# Patient Record
Sex: FEMALE | Race: WHITE | Employment: PART TIME | URBAN - METROPOLITAN AREA
[De-identification: names, ages, dates, MRNs, and addresses within clinical notes are randomized per-mention and may not be internally consistent; named-entity substitution may affect disease eponyms.]

---

## 2017-01-23 DIAGNOSIS — G43.009 MIGRAINE WITHOUT AURA AND WITHOUT STATUS MIGRAINOSUS, NOT INTRACTABLE: ICD-10-CM

## 2017-01-23 RX ORDER — SUMATRIPTAN 100 MG/1
100 TABLET, FILM COATED ORAL
Qty: 9 TABLET | Refills: 3 | Status: SHIPPED | OUTPATIENT
Start: 2017-01-23 | End: 2017-04-25 | Stop reason: SDUPTHER

## 2017-04-25 ENCOUNTER — OFFICE VISIT (OUTPATIENT)
Dept: FAMILY MEDICINE CLINIC | Age: 37
End: 2017-04-25
Payer: COMMERCIAL

## 2017-04-25 VITALS
SYSTOLIC BLOOD PRESSURE: 118 MMHG | BODY MASS INDEX: 18.83 KG/M2 | DIASTOLIC BLOOD PRESSURE: 69 MMHG | WEIGHT: 113 LBS | TEMPERATURE: 97.5 F | HEIGHT: 65 IN | HEART RATE: 102 BPM

## 2017-04-25 DIAGNOSIS — J30.1 SEASONAL ALLERGIC RHINITIS DUE TO POLLEN: Primary | ICD-10-CM

## 2017-04-25 DIAGNOSIS — G43.009 MIGRAINE WITHOUT AURA AND WITHOUT STATUS MIGRAINOSUS, NOT INTRACTABLE: ICD-10-CM

## 2017-04-25 PROCEDURE — 99214 OFFICE O/P EST MOD 30 MIN: CPT | Performed by: INTERNAL MEDICINE

## 2017-04-25 RX ORDER — FLUTICASONE PROPIONATE 50 MCG
1 SPRAY, SUSPENSION (ML) NASAL DAILY
Qty: 1 BOTTLE | Refills: 2 | Status: SHIPPED | OUTPATIENT
Start: 2017-04-25 | End: 2018-03-22 | Stop reason: SDUPTHER

## 2017-04-25 RX ORDER — SUMATRIPTAN 100 MG/1
100 TABLET, FILM COATED ORAL
Qty: 9 TABLET | Refills: 2 | Status: SHIPPED | OUTPATIENT
Start: 2017-04-25 | End: 2017-08-28 | Stop reason: SDUPTHER

## 2017-08-28 DIAGNOSIS — G43.009 MIGRAINE WITHOUT AURA AND WITHOUT STATUS MIGRAINOSUS, NOT INTRACTABLE: ICD-10-CM

## 2017-08-28 RX ORDER — SUMATRIPTAN 100 MG/1
100 TABLET, FILM COATED ORAL
Qty: 9 TABLET | Refills: 2 | Status: SHIPPED | OUTPATIENT
Start: 2017-08-28 | End: 2017-11-02 | Stop reason: SDUPTHER

## 2017-11-02 ENCOUNTER — OFFICE VISIT (OUTPATIENT)
Dept: FAMILY MEDICINE CLINIC | Age: 37
End: 2017-11-02
Payer: COMMERCIAL

## 2017-11-02 VITALS
WEIGHT: 112.2 LBS | HEIGHT: 65 IN | SYSTOLIC BLOOD PRESSURE: 109 MMHG | HEART RATE: 85 BPM | RESPIRATION RATE: 16 BRPM | BODY MASS INDEX: 18.69 KG/M2 | DIASTOLIC BLOOD PRESSURE: 66 MMHG | TEMPERATURE: 98.1 F

## 2017-11-02 DIAGNOSIS — J30.1 CHRONIC SEASONAL ALLERGIC RHINITIS DUE TO POLLEN: Primary | ICD-10-CM

## 2017-11-02 DIAGNOSIS — G43.009 MIGRAINE WITHOUT AURA AND WITHOUT STATUS MIGRAINOSUS, NOT INTRACTABLE: ICD-10-CM

## 2017-11-02 PROCEDURE — 99213 OFFICE O/P EST LOW 20 MIN: CPT | Performed by: INTERNAL MEDICINE

## 2017-11-02 RX ORDER — SUMATRIPTAN 100 MG/1
100 TABLET, FILM COATED ORAL
Qty: 9 TABLET | Refills: 2 | Status: SHIPPED | OUTPATIENT
Start: 2017-11-02 | End: 2018-01-08 | Stop reason: SDUPTHER

## 2017-11-02 ASSESSMENT — ENCOUNTER SYMPTOMS
BLURRED VISION: 0
DOUBLE VISION: 0
ABDOMINAL PAIN: 0
HEMOPTYSIS: 0
NAUSEA: 0
COUGH: 0
HEARTBURN: 0

## 2017-11-02 ASSESSMENT — PATIENT HEALTH QUESTIONNAIRE - PHQ9
SUM OF ALL RESPONSES TO PHQ QUESTIONS 1-9: 0
SUM OF ALL RESPONSES TO PHQ9 QUESTIONS 1 & 2: 0
1. LITTLE INTEREST OR PLEASURE IN DOING THINGS: 0
2. FEELING DOWN, DEPRESSED OR HOPELESS: 0

## 2017-11-02 NOTE — PROGRESS NOTES
[facility] & [facility] PHYSICIANS  [facility] [address] [place] [address]  Dept: [phone]  Dept Fax: [phone]    Office Progress/Follow Up Note  Date of patient's visit: 11/2/2017  Patient's Name:  Cleve Ruggiero YOB: 1980            Patient Care Team:  Olivia Ross MD as PCP - General (Internal Medicine)  ================================================================    REASON FOR VISIT/CHIEF COMPLAINT:  6 Month Follow-Up and Allergies    HISTORY OF PRESENTING ILLNESS:  History was obtained from: patient. Cleve Ruggiero is a 40 y.o. is here for a routine 6 months follow-up. Overall patient is doing well. She has history of migraine headaches which is controlled with Imitrex. She has episodes about once every 2 weeks with headache in the frontal area. She doesn't have any nausea or blurry vision with this. Usually stress and light exacerbates her symptoms. She also has history of allergic rhinitis for which she uses Flonase. She uses it about 3 times in a month. She reported that the right nostril since more congested than the left. Today she has no new complaints. She is due for medication refills. She is declining flu vaccine today.        Patient Active Problem List   Diagnosis    Migraine    Renal stone    Seasonal allergic rhinitis due to pollen       Health Maintenance Due   Topic Date Due    DTaP/Tdap/Td vaccine (1 - Tdap) 07/30/2000    Cervical cancer screen  07/30/2002    Flu vaccine (1) 09/01/2017       Allergies   Allergen Reactions    Eggs Or Egg-Derived Products Swelling         Current Outpatient Prescriptions   Medication Sig Dispense Refill    SUMAtriptan (IMITREX) 100 MG tablet Take 1 tablet by mouth once as needed for Migraine 9 tablet 2    fluticasone (FLONASE) 50 MCG/ACT nasal spray 1 spray by Nasal route daily 1 Bottle 2    BLISOVI FE 1/20 1-20 MG-MCG per tablet        No current facility-administered medications for this visit. Social History   Substance Use Topics    Smoking status: Former Smoker     Types: Cigarettes    Smokeless tobacco: Never Used    Alcohol use No       Family History   Problem Relation Age of Onset    Diabetes Father     Heart Disease Neg Hx     High Blood Pressure Neg Hx     Stroke Neg Hx         REVIEW OF SYSTEMS:  Review of Systems   Constitutional: Negative for chills and fever. HENT: Positive for congestion. Negative for ear discharge and nosebleeds. Eyes: Negative for blurred vision and double vision. Respiratory: Negative for cough and hemoptysis. Cardiovascular: Negative for chest pain and palpitations. Gastrointestinal: Negative for abdominal pain, heartburn and nausea. Genitourinary: Negative for dysuria and urgency. Musculoskeletal: Negative for myalgias and neck pain. Neurological: Negative for dizziness and headaches. Endo/Heme/Allergies: Does not bruise/bleed easily. Psychiatric/Behavioral: Negative for depression and suicidal ideas. PHYSICAL EXAM:  Vitals:    11/02/17 0917   BP: 109/66   Site: Right Arm   Position: Sitting   Cuff Size: Medium Adult   Pulse: 85   Resp: 16   Temp: 98.1 °F (36.7 °C)   TempSrc: Oral   Weight: 112 lb 3.2 oz (50.9 kg)   Height: 5' 5\" (1.651 m)     BP Readings from Last 3 Encounters:   11/02/17 109/66   04/25/17 118/69   10/25/16 97/62        Physical Exam   Constitutional: She is oriented to person, place, and time and well-developed, well-nourished, and in no distress. No distress. HENT:   Mouth/Throat: No oropharyngeal exudate. Neck: Normal range of motion. Neck supple. No thyromegaly present. Cardiovascular: Normal rate, regular rhythm, normal heart sounds and intact distal pulses. Pulmonary/Chest: Effort normal and breath sounds normal. No respiratory distress. She has no wheezes. Abdominal: Soft. Bowel sounds are normal. She exhibits no distension and no mass. There is no tenderness.    Musculoskeletal:

## 2018-01-08 DIAGNOSIS — G43.009 MIGRAINE WITHOUT AURA AND WITHOUT STATUS MIGRAINOSUS, NOT INTRACTABLE: ICD-10-CM

## 2018-01-08 RX ORDER — SUMATRIPTAN 100 MG/1
100 TABLET, FILM COATED ORAL
Qty: 9 TABLET | Refills: 2 | Status: SHIPPED | OUTPATIENT
Start: 2018-01-08 | End: 2018-03-22 | Stop reason: SDUPTHER

## 2018-01-08 NOTE — TELEPHONE ENCOUNTER
From: Adilson Del Cid  Sent: 1/8/2018 1:01 PM EST  Subject: Medication Renewal Request    Adilson Del Cid would like a refill of the following medications:  SUMAtriptan (IMITREX) 100 MG tablet [Olivia Ulrich MD]    Preferred pharmacy: [address], [address] [place] [address] [phone]    Comment:

## 2018-01-08 NOTE — TELEPHONE ENCOUNTER
From: Rodriguez Gómez  Sent: 1/8/2018 12:24 PM EST  Subject: Medication Renewal Request    Rodriguez Gómez would like a refill of the following medications:  SUMAtriptan (IMITREX) 100 MG tablet [Olivia Mayo MD]    Preferred pharmacy: [address], [address] [facility] Dionte Neighbor [phone]    Comment:

## 2018-03-22 DIAGNOSIS — G43.009 MIGRAINE WITHOUT AURA AND WITHOUT STATUS MIGRAINOSUS, NOT INTRACTABLE: ICD-10-CM

## 2018-03-22 DIAGNOSIS — J30.1 SEASONAL ALLERGIC RHINITIS DUE TO POLLEN: ICD-10-CM

## 2018-03-22 RX ORDER — SUMATRIPTAN 100 MG/1
100 TABLET, FILM COATED ORAL
Qty: 9 TABLET | Refills: 2 | Status: SHIPPED | OUTPATIENT
Start: 2018-03-22 | End: 2018-05-03 | Stop reason: SDUPTHER

## 2018-03-22 RX ORDER — FLUTICASONE PROPIONATE 50 MCG
1 SPRAY, SUSPENSION (ML) NASAL DAILY
Qty: 1 BOTTLE | Refills: 2 | Status: SHIPPED | OUTPATIENT
Start: 2018-03-22 | End: 2018-05-03 | Stop reason: SDUPTHER

## 2018-03-22 NOTE — TELEPHONE ENCOUNTER
From: Gavin Hill  Sent: 3/22/2018 9:21 AM EDT  Subject: Medication Renewal Request    Gavin Hill would like a refill of the following medications:     fluticasone (FLONASE) 50 MCG/ACT nasal spray [Olivia Mary MD]     SUMAtriptan (IMITREX) 100 MG tablet [Olivia Mary MD]    Preferred pharmacy: [facility] [address] [place] [address] [address], [facility]

## 2018-05-03 ENCOUNTER — OFFICE VISIT (OUTPATIENT)
Dept: FAMILY MEDICINE CLINIC | Age: 38
End: 2018-05-03
Payer: COMMERCIAL

## 2018-05-03 VITALS
HEART RATE: 92 BPM | BODY MASS INDEX: 19.03 KG/M2 | DIASTOLIC BLOOD PRESSURE: 67 MMHG | TEMPERATURE: 97.9 F | RESPIRATION RATE: 16 BRPM | HEIGHT: 65 IN | SYSTOLIC BLOOD PRESSURE: 109 MMHG | WEIGHT: 114.2 LBS

## 2018-05-03 DIAGNOSIS — G43.009 MIGRAINE WITHOUT AURA AND WITHOUT STATUS MIGRAINOSUS, NOT INTRACTABLE: ICD-10-CM

## 2018-05-03 DIAGNOSIS — J30.1 CHRONIC SEASONAL ALLERGIC RHINITIS DUE TO POLLEN: ICD-10-CM

## 2018-05-03 PROCEDURE — 99213 OFFICE O/P EST LOW 20 MIN: CPT | Performed by: INTERNAL MEDICINE

## 2018-05-03 RX ORDER — SUMATRIPTAN 100 MG/1
100 TABLET, FILM COATED ORAL
Qty: 9 TABLET | Refills: 2 | Status: SHIPPED | OUTPATIENT
Start: 2018-05-03 | End: 2018-05-16 | Stop reason: SDUPTHER

## 2018-05-03 RX ORDER — FLUTICASONE PROPIONATE 50 MCG
1 SPRAY, SUSPENSION (ML) NASAL DAILY
Qty: 1 BOTTLE | Refills: 2 | Status: SHIPPED | OUTPATIENT
Start: 2018-05-03 | End: 2018-11-08 | Stop reason: SDUPTHER

## 2018-05-03 RX ORDER — LORATADINE 10 MG/1
10 TABLET ORAL DAILY
Qty: 30 TABLET | Refills: 2 | Status: SHIPPED | OUTPATIENT
Start: 2018-05-03 | End: 2018-11-08

## 2018-05-03 ASSESSMENT — ENCOUNTER SYMPTOMS
BLURRED VISION: 0
HEMOPTYSIS: 0
DOUBLE VISION: 0
HEARTBURN: 0
NAUSEA: 0
COUGH: 0
ABDOMINAL PAIN: 0

## 2018-05-16 ENCOUNTER — TELEPHONE (OUTPATIENT)
Dept: FAMILY MEDICINE CLINIC | Age: 38
End: 2018-05-16

## 2018-05-16 DIAGNOSIS — G43.009 MIGRAINE WITHOUT AURA AND WITHOUT STATUS MIGRAINOSUS, NOT INTRACTABLE: ICD-10-CM

## 2018-05-16 RX ORDER — SUMATRIPTAN 100 MG/1
100 TABLET, FILM COATED ORAL DAILY PRN
Qty: 9 TABLET | Refills: 2 | Status: SHIPPED | OUTPATIENT
Start: 2018-05-16 | End: 2018-07-13 | Stop reason: SDUPTHER

## 2018-07-13 DIAGNOSIS — G43.009 MIGRAINE WITHOUT AURA AND WITHOUT STATUS MIGRAINOSUS, NOT INTRACTABLE: ICD-10-CM

## 2018-07-13 RX ORDER — SUMATRIPTAN 100 MG/1
100 TABLET, FILM COATED ORAL DAILY PRN
Qty: 9 TABLET | Refills: 2 | Status: SHIPPED | OUTPATIENT
Start: 2018-07-13 | End: 2018-09-14 | Stop reason: SDUPTHER

## 2018-09-14 DIAGNOSIS — G43.009 MIGRAINE WITHOUT AURA AND WITHOUT STATUS MIGRAINOSUS, NOT INTRACTABLE: ICD-10-CM

## 2018-09-14 RX ORDER — SUMATRIPTAN 100 MG/1
100 TABLET, FILM COATED ORAL DAILY PRN
Qty: 9 TABLET | Refills: 2 | Status: SHIPPED | OUTPATIENT
Start: 2018-09-14 | End: 2018-11-08 | Stop reason: SDUPTHER

## 2018-09-14 NOTE — TELEPHONE ENCOUNTER
From: Homero Ramirez  Sent: 9/14/2018 10:53 AM EDT  Subject: Medication Renewal Request    Homero Ramirez would like a refill of the following medications:     SUMAtriptan (IMITREX) 100 MG tablet [Olivia Healy MD]    Preferred pharmacy: [facility] [address] [place] [address] [address], [address] [place] [address] [phone]

## 2018-11-08 ENCOUNTER — OFFICE VISIT (OUTPATIENT)
Dept: FAMILY MEDICINE CLINIC | Age: 38
End: 2018-11-08
Payer: COMMERCIAL

## 2018-11-08 VITALS
BODY MASS INDEX: 18.66 KG/M2 | WEIGHT: 112 LBS | TEMPERATURE: 97.3 F | DIASTOLIC BLOOD PRESSURE: 69 MMHG | HEIGHT: 65 IN | HEART RATE: 83 BPM | SYSTOLIC BLOOD PRESSURE: 107 MMHG

## 2018-11-08 DIAGNOSIS — J30.1 CHRONIC SEASONAL ALLERGIC RHINITIS DUE TO POLLEN: Primary | ICD-10-CM

## 2018-11-08 DIAGNOSIS — G43.009 MIGRAINE WITHOUT AURA AND WITHOUT STATUS MIGRAINOSUS, NOT INTRACTABLE: ICD-10-CM

## 2018-11-08 DIAGNOSIS — Z11.4 SCREENING FOR HUMAN IMMUNODEFICIENCY VIRUS: ICD-10-CM

## 2018-11-08 PROCEDURE — 99213 OFFICE O/P EST LOW 20 MIN: CPT | Performed by: INTERNAL MEDICINE

## 2018-11-08 RX ORDER — SUMATRIPTAN 100 MG/1
100 TABLET, FILM COATED ORAL DAILY PRN
Qty: 9 TABLET | Refills: 2 | Status: SHIPPED | OUTPATIENT
Start: 2018-11-08 | End: 2019-02-26 | Stop reason: SDUPTHER

## 2018-11-08 RX ORDER — FLUTICASONE PROPIONATE 50 MCG
1 SPRAY, SUSPENSION (ML) NASAL DAILY
Qty: 1 BOTTLE | Refills: 2 | Status: SHIPPED | OUTPATIENT
Start: 2018-11-08 | End: 2020-07-08 | Stop reason: SDUPTHER

## 2018-11-08 ASSESSMENT — ENCOUNTER SYMPTOMS
CONSTIPATION: 0
NAUSEA: 0
BLOOD IN STOOL: 0
PHOTOPHOBIA: 0
VOMITING: 0
EYE PAIN: 0
SORE THROAT: 0
ABDOMINAL PAIN: 0
WHEEZING: 0
DIARRHEA: 0
SHORTNESS OF BREATH: 0
COLOR CHANGE: 0
COUGH: 0
EYE DISCHARGE: 0

## 2018-11-08 ASSESSMENT — PATIENT HEALTH QUESTIONNAIRE - PHQ9
SUM OF ALL RESPONSES TO PHQ QUESTIONS 1-9: 0
2. FEELING DOWN, DEPRESSED OR HOPELESS: 0
SUM OF ALL RESPONSES TO PHQ9 QUESTIONS 1 & 2: 0
1. LITTLE INTEREST OR PLEASURE IN DOING THINGS: 0
SUM OF ALL RESPONSES TO PHQ QUESTIONS 1-9: 0

## 2018-11-08 NOTE — PROGRESS NOTES
[facility] & [facility] PHYSICIANS  [facility] [address] Far[address] [place] [address]  Dept: [phone]  Dept Fax: [phone]    Office Progress/Follow Up Note  Date ofpatient's visit: 11/8/2018  Patient's Name:  Erinn Patel YOB: 1980            Patient Care Team:  Olivia De Leon MD as PCP - General (Internal Medicine)  ================================================================    REASON FOR VISIT/CHIEF COMPLAINT:  Headache    HISTORY OF PRESENTING ILLNESS:  History was obtained from: patient. Roseline swanson 41 y.o. is here for a follow-up visit. Overall patient is doing well. Migraine headache is controlled with Imitrex which she takes about once a week as needed. Her asthma is controlled with over-the-counter Claritin-D and Flonase. She has no new complaints today. She is due for medication refills. Problem list, medications and blood work reviewed       Patient Active Problem List   Diagnosis    Migraine    Renal stone    Seasonal allergic rhinitis due to pollen       Health Maintenance Due   Topic Date Due    HIV screen  07/30/1996    DTaP/Tdap/Td vaccine (1 - Tdap) 07/30/2000    Cervical cancer screen  07/30/2002    Flu vaccine (1) 09/01/2018       Allergies   Allergen Reactions    Eggs Or Egg-Derived Products Swelling         Current Outpatient Prescriptions   Medication Sig Dispense Refill    fluticasone (FLONASE) 50 MCG/ACT nasal spray 1 spray by Nasal route daily 1 Bottle 2    SUMAtriptan (IMITREX) 100 MG tablet Take 1 tablet by mouth daily as needed for Migraine 9 tablet 2    BLISOVI FE 1/20 1-20 MG-MCG per tablet        No current facility-administered medications for this visit.         Social History   Substance Use Topics    Smoking status: Former Smoker     Types: Cigarettes    Smokeless tobacco: Never Used    Alcohol use No       Family History   Problem Relation Age of Onset    Diabetes Father     Heart Disease Neg Hx     High tenderness. Musculoskeletal: Normal range of motion. She exhibits no edema or tenderness. Neurological: She is alert and oriented to person, place, and time. No cranial nerve deficit. Skin: Skin is warm. No rash noted. She is not diaphoretic. No erythema. Psychiatric: Judgment normal.         DIAGNOSTIC FINDINGS:  CBC:  Lab Results   Component Value Date    WBC 5.2 03/16/2016    HGB 12.9 03/16/2016     03/16/2016       BMP:    Lab Results   Component Value Date     03/16/2016    K 4.2 03/16/2016     03/16/2016    CO2 23 03/16/2016    BUN 8 03/16/2016    CREATININE 0.66 03/16/2016    GLUCOSE 85 03/16/2016       ASSESSMENT AND PLAN:  Lisa Harper was seen today for headache. Diagnoses and all orders for this visit:    Chronic seasonal allergic rhinitis due to pollen  -     fluticasone (FLONASE) 50 MCG/ACT nasal spray; 1 spray by Nasal route daily    Migraine without aura and without status migrainosus, not intractable  -     SUMAtriptan (IMITREX) 100 MG tablet; Take 1 tablet by mouth daily as needed for Migraine    Screening for human immunodeficiency virus  -     HIV Screen; Future      FOLLOW UP AND INSTRUCTIONS:  · Return in about 6 months (around 5/8/2019) for [place]. · Lisa Harper received counseling on the following healthy behaviors: nutrition and exercise    · Discussed use, benefit, and side effects of prescribed medications. Barriers to medication compliance addressed. All patient questions answered. Pt voiced understanding. · Patient given educational materials - see patient instructions    Olivia Perrin MD, CARISA, [address] [place] [address] Internal Medicine Associate  11/8/2018, 10:43 AM    This note is created with the assistance of a speech-recognition program. While intending to generate a document that actually reflects the content of thevisit, the document can still have some mistakes which may not have been identified and corrected by editing. UTI (urinary tract infection)

## 2019-02-26 DIAGNOSIS — G43.009 MIGRAINE WITHOUT AURA AND WITHOUT STATUS MIGRAINOSUS, NOT INTRACTABLE: ICD-10-CM

## 2019-02-26 RX ORDER — SUMATRIPTAN 100 MG/1
100 TABLET, FILM COATED ORAL DAILY PRN
Qty: 9 TABLET | Refills: 2 | Status: SHIPPED | OUTPATIENT
Start: 2019-02-26 | End: 2019-05-31 | Stop reason: SDUPTHER

## 2019-05-31 DIAGNOSIS — G43.009 MIGRAINE WITHOUT AURA AND WITHOUT STATUS MIGRAINOSUS, NOT INTRACTABLE: ICD-10-CM

## 2019-05-31 NOTE — TELEPHONE ENCOUNTER
Electronic medication refill request. Pharmacy on file. Please advise.       Last visit: 11/8/18  Last Med refill: 4/26/19      Next Visit Date:  Future Appointments   Date Time Provider Gavin Appiah   6/6/2019  9:00 AM Olivia Casiano MD [facility] [address] Maintenance   Topic Date Due    Varicella Vaccine (1 of 2 - 13+ 2-dose series) 07/30/1994    HIV screen  07/30/1996    DTaP/Tdap/Td vaccine (1 - Tdap) 07/30/2000    Cervical cancer screen  07/30/2002    Flu vaccine (Season Ended) 09/01/2019    Pneumococcal 0-64 years Vaccine  Aged Out       No results found for: LABA1C          ( goal A1C is < 7)   No results found for: LABMICR  No results found for: LDLCHOLESTEROL, LDLCALC    (goal LDL is <100)   BUN (mg/dL)   Date Value   03/16/2016 8     BP Readings from Last 3 Encounters:   11/08/18 107/69   05/03/18 109/67   11/02/17 109/66          (goal 120/80)    All Future Testing planned in [facility]  Lab Frequency Next Occurrence   HIV Screen Once 08/24/2019               Patient Active Problem List:     Migraine     Renal stone     Seasonal allergic rhinitis due to pollen

## 2019-06-03 RX ORDER — SUMATRIPTAN 100 MG/1
100 TABLET, FILM COATED ORAL DAILY PRN
Qty: 9 TABLET | Refills: 2 | Status: SHIPPED | OUTPATIENT
Start: 2019-06-03 | End: 2019-08-19 | Stop reason: SDUPTHER

## 2019-06-06 ENCOUNTER — OFFICE VISIT (OUTPATIENT)
Dept: FAMILY MEDICINE CLINIC | Age: 39
End: 2019-06-06
Payer: COMMERCIAL

## 2019-06-06 VITALS
HEART RATE: 93 BPM | WEIGHT: 115.8 LBS | TEMPERATURE: 98.1 F | HEIGHT: 65 IN | RESPIRATION RATE: 16 BRPM | DIASTOLIC BLOOD PRESSURE: 60 MMHG | BODY MASS INDEX: 19.29 KG/M2 | SYSTOLIC BLOOD PRESSURE: 96 MMHG

## 2019-06-06 DIAGNOSIS — G43.009 MIGRAINE WITHOUT AURA AND WITHOUT STATUS MIGRAINOSUS, NOT INTRACTABLE: Primary | ICD-10-CM

## 2019-06-06 DIAGNOSIS — J30.1 SEASONAL ALLERGIC RHINITIS DUE TO POLLEN: ICD-10-CM

## 2019-06-06 PROCEDURE — 99213 OFFICE O/P EST LOW 20 MIN: CPT | Performed by: INTERNAL MEDICINE

## 2019-06-06 ASSESSMENT — ENCOUNTER SYMPTOMS
COLOR CHANGE: 0
VOMITING: 0
WHEEZING: 0
ABDOMINAL PAIN: 0
COUGH: 0
SORE THROAT: 0
EYE DISCHARGE: 0
DIARRHEA: 0
CONSTIPATION: 0
PHOTOPHOBIA: 0
SHORTNESS OF BREATH: 0
BLOOD IN STOOL: 0
NAUSEA: 0
EYE PAIN: 0

## 2019-06-06 ASSESSMENT — PATIENT HEALTH QUESTIONNAIRE - PHQ9
1. LITTLE INTEREST OR PLEASURE IN DOING THINGS: 0
SUM OF ALL RESPONSES TO PHQ QUESTIONS 1-9: 0
SUM OF ALL RESPONSES TO PHQ QUESTIONS 1-9: 0
2. FEELING DOWN, DEPRESSED OR HOPELESS: 0
SUM OF ALL RESPONSES TO PHQ9 QUESTIONS 1 & 2: 0

## 2019-06-06 NOTE — PROGRESS NOTES
[address] [place] [address]  [address] [facility] [place] [address]  Dept: [phone]  Dept Fax: [phone]    Office Progress/Follow Up Note  Date ofpatient's visit: 6/6/2019  Patient's Name:  Mel Rubio YOB: 1980            Patient Care Team:  Olivia Catsellanos MD as PCP - General (Internal Medicine)  Olivia Castellanos MD as PCP - [facility] Empaneled Provider  ================================================================    REASON FOR VISIT/CHIEF COMPLAINT:  6 Month Follow-Up and Headache    HISTORY OF PRESENTING ILLNESS:  History was obtained from: patient. Christiano swanson 41 y.o. is here for a follow-up visit. Overall patient is doing well. She has history of migraine disease and takes occasional Imitrex. She reported that her migraine has improved significantly. She has about one episode a month. She also has history of seasonal allergy and currently takes over-the-counter Claritin. She has no new complaints today. Problem list, medications and blood work reviewed       Patient Active Problem List   Diagnosis    Migraine    Seasonal allergic rhinitis due to pollen       Health Maintenance Due   Topic Date Due    Varicella Vaccine (1 of 2 - 13+ 2-dose series) 07/30/1994    HIV screen  07/30/1996    DTaP/Tdap/Td vaccine (1 - Tdap) 07/30/2000    Cervical cancer screen  07/30/2002       Allergies   Allergen Reactions    Eggs Or Egg-Derived Products Swelling         Current Outpatient Medications   Medication Sig Dispense Refill    SUMAtriptan (IMITREX) 100 MG tablet Take 1 tablet by mouth daily as needed for Migraine 9 tablet 2    fluticasone (FLONASE) 50 MCG/ACT nasal spray 1 spray by Nasal route daily 1 Bottle 2    BLISOVI FE 1/20 1-20 MG-MCG per tablet        No current facility-administered medications for this visit.         Social History     Tobacco Use    Smoking status: Former Smoker     Types: Cigarettes    Smokeless tobacco: Never Used   Substance Use Topics    Alcohol use: No     Alcohol/week: 0.0 oz    Drug use: No       Family History   Problem Relation Age of Onset    Diabetes Father     Heart Disease Neg Hx     High Blood Pressure Neg Hx     Stroke Neg Hx         REVIEW OF SYSTEMS:  Review of Systems   Constitutional: Negative for fatigue and fever. HENT: Negative for congestion and sore throat. Eyes: Negative for photophobia, pain, discharge and visual disturbance. Respiratory: Negative for cough, shortness of breath and wheezing. Cardiovascular: Negative for chest pain, palpitations and leg swelling. Gastrointestinal: Negative for abdominal pain, blood in stool, constipation, diarrhea, nausea and vomiting. Genitourinary: Negative for dysuria, frequency and urgency. Musculoskeletal: Negative for arthralgias and myalgias. Skin: Negative for color change and rash. Neurological: Negative for dizziness, tremors, seizures, syncope, weakness, numbness and headaches. Psychiatric/Behavioral: Negative for agitation, behavioral problems, confusion, sleep disturbance and suicidal ideas. PHYSICAL EXAM:  Vitals:    06/06/19 0903   BP: 96/60   Site: Left Upper Arm   Position: Sitting   Cuff Size: Medium Adult   Pulse: 93   Resp: 16   Temp: 98.1 °F (36.7 °C)   TempSrc: Oral   Weight: 115 lb 12.8 oz (52.5 kg)   Height: 5' 5\" (1.651 m)     BP Readings from Last 3 Encounters:   06/06/19 96/60   11/08/18 107/69   05/03/18 109/67        Physical Exam   Constitutional: She is oriented to person, place, and time. No distress. HENT:   Head: Normocephalic and atraumatic. Right Ear: External ear normal.   Left Ear: External ear normal.   Nose: Nose normal.   Mouth/Throat: No oropharyngeal exudate. Eyes: Pupils are equal, round, and reactive to light. EOM are normal.   Neck: Normal range of motion. Neck supple. No thyromegaly present.    Cardiovascular: Normal rate, regular rhythm, normal heart sounds and intact distal pulses. Pulmonary/Chest: Effort normal and breath sounds normal. No respiratory distress. She has no wheezes. Abdominal: Soft. Bowel sounds are normal. She exhibits no distension and no mass. There is no tenderness. Musculoskeletal: Normal range of motion. She exhibits no edema or tenderness. Neurological: She is alert and oriented to person, place, and time. No cranial nerve deficit. Skin: Skin is warm. No rash noted. She is not diaphoretic. No erythema. Psychiatric: Judgment normal.         DIAGNOSTIC FINDINGS:  CBC:  Lab Results   Component Value Date    WBC 5.2 03/16/2016    HGB 12.9 03/16/2016     03/16/2016       BMP:    Lab Results   Component Value Date     03/16/2016    K 4.2 03/16/2016     03/16/2016    CO2 23 03/16/2016    BUN 8 03/16/2016    CREATININE 0.66 03/16/2016    GLUCOSE 85 03/16/2016       HEMOGLOBIN A1C: No results found for: LABA1C    FASTING LIPID PANEL:No results found for: CHOL, HDL, TRIG    ASSESSMENT AND PLAN:  Cherise Velasquez was seen today for 6 month follow-up and headache. Diagnoses and all orders for this visit:    Migraine without aura and without status migrainosus, not intractable. Controlled. Continue Imitrex as needed. Seasonal allergic rhinitis due to pollen. Controlled. Continue over-the-counter Claritin. FOLLOW UP AND INSTRUCTIONS:  · Return in about 6 months (around 12/6/2019) for [place]. · Cherise Velasquez received counseling on the following healthy behaviors: nutrition and exercise    · Discussed use, benefit, and side effects of prescribed medications. Barriers to medication compliance addressed. All patient questions answered. Pt voiced understanding. · Patient given educational materials - see patient instructions    Olivia Omalley M.D., F.A.C.P.   Internal Medicine  6/6/2019, 9:41 AM    This note is created with the assistance of a speech-recognition program. While intending to generate a document that actually reflects the content of thevisit, the document can still have some mistakes which may not have been identified and corrected by editing.

## 2019-06-06 NOTE — PROGRESS NOTES
Visit Information    Have you changed or started any medications since your last visit including any over-the-counter medicines, vitamins, or herbal medicines? no   Are you having any side effects from any of your medications? -  no  Have you stopped taking any of your medications? Is so, why? -  no    Have you seen any other physician or provider since your last visit? No  Have you had any other diagnostic tests since your last visit? No  Have you been seen in the emergency room and/or had an admission to a hospital since we last saw you? No  Have you had your routine dental cleaning in the past 6 months? no    Have you activated your [employer] account? If not, what are your barriers?  Yes     Patient Care Team:  Olivia Ventura MD as PCP - General (Internal Medicine)  Olivia Ventura MD as PCP - [facility]    Medical History Review  Past Medical, Family, and Social History reviewed and does not contribute to the patient presenting condition    Health Maintenance   Topic Date Due    Varicella Vaccine (1 of 2 - 13+ 2-dose series) 07/30/1994    HIV screen  07/30/1996    DTaP/Tdap/Td vaccine (1 - Tdap) 07/30/2000    Cervical cancer screen  07/30/2002    Flu vaccine (Season Ended) 09/01/2019    Pneumococcal 0-64 years Vaccine  Aged Out

## 2019-08-19 DIAGNOSIS — G43.009 MIGRAINE WITHOUT AURA AND WITHOUT STATUS MIGRAINOSUS, NOT INTRACTABLE: ICD-10-CM

## 2019-08-19 RX ORDER — SUMATRIPTAN 100 MG/1
100 TABLET, FILM COATED ORAL DAILY PRN
Qty: 9 TABLET | Refills: 2 | Status: SHIPPED | OUTPATIENT
Start: 2019-08-19 | End: 2020-01-08 | Stop reason: SDUPTHER

## 2020-01-08 ENCOUNTER — OFFICE VISIT (OUTPATIENT)
Dept: FAMILY MEDICINE CLINIC | Age: 40
End: 2020-01-08
Payer: COMMERCIAL

## 2020-01-08 VITALS
BODY MASS INDEX: 18.33 KG/M2 | TEMPERATURE: 97.8 F | HEART RATE: 84 BPM | HEIGHT: 65 IN | DIASTOLIC BLOOD PRESSURE: 77 MMHG | RESPIRATION RATE: 16 BRPM | WEIGHT: 110 LBS | SYSTOLIC BLOOD PRESSURE: 121 MMHG

## 2020-01-08 PROCEDURE — 99213 OFFICE O/P EST LOW 20 MIN: CPT | Performed by: FAMILY MEDICINE

## 2020-01-08 RX ORDER — SUMATRIPTAN 100 MG/1
100 TABLET, FILM COATED ORAL DAILY PRN
Qty: 9 TABLET | Refills: 2 | Status: SHIPPED | OUTPATIENT
Start: 2020-01-08 | End: 2020-04-24 | Stop reason: SDUPTHER

## 2020-01-08 RX ORDER — TOPIRAMATE 25 MG/1
25 TABLET ORAL 2 TIMES DAILY
Qty: 60 TABLET | Refills: 0 | Status: SHIPPED | OUTPATIENT
Start: 2020-01-08 | End: 2020-02-17

## 2020-01-08 ASSESSMENT — PATIENT HEALTH QUESTIONNAIRE - PHQ9
SUM OF ALL RESPONSES TO PHQ QUESTIONS 1-9: 0
SUM OF ALL RESPONSES TO PHQ9 QUESTIONS 1 & 2: 0
1. LITTLE INTEREST OR PLEASURE IN DOING THINGS: 0
2. FEELING DOWN, DEPRESSED OR HOPELESS: 0
SUM OF ALL RESPONSES TO PHQ QUESTIONS 1-9: 0

## 2020-01-08 ASSESSMENT — ENCOUNTER SYMPTOMS
NAUSEA: 0
RESPIRATORY NEGATIVE: 1
SINUS PRESSURE: 0
SORE THROAT: 0
EYE PAIN: 0
EYE REDNESS: 0
ABDOMINAL PAIN: 0
BLURRED VISION: 1
RHINORRHEA: 0
GASTROINTESTINAL NEGATIVE: 1

## 2020-01-08 NOTE — PROGRESS NOTES
[address] PRIMARY CARE  [address] [facility] [address]  Dept: [phone]  Dept Fax: [phone]    Davion Cohen is a 46 y.o. female who presents today for her medical conditions/complaints as noted below. Davion Cohen is c/o of   Chief Complaint   Patient presents with    6 Month Follow-Up         HPI:           Migraine    This is a chronic problem. The current episode started more than 1 year ago. The problem occurs monthly. The problem has been gradually worsening. The pain is located in the right unilateral region. The pain quality is similar to prior headaches. The quality of the pain is described as aching, sharp, stabbing and throbbing. The pain is moderate. Associated symptoms include blurred vision. Pertinent negatives include no abdominal pain, anorexia, ear pain, eye pain, eye redness, fever, nausea, numbness, rhinorrhea, sinus pressure or sore throat. The symptoms are aggravated by menstrual cycle. She has tried ergotamines for the symptoms. The treatment provided moderate relief. Her past medical history is significant for migraine headaches and obesity. There is no history of cancer, hypertension or recent head traumas. No results found for: LABA1C          ( goal A1Cis < 7)   No results found for: LABMICR  No results found for: LDLCHOLESTEROL, LDLCALC    (goal LDL is <100)   BUN (mg/dL)   Date Value   03/16/2016 8     BP Readings from Last 3 Encounters:   01/08/20 121/77   06/06/19 96/60   11/08/18 107/69          (goal 120/80)    Past Medical History:   Diagnosis Date    Migraine 7/7/2015    Renal stone 10/25/2016    Seasonal allergic rhinitis due to pollen 4/25/2017      No past surgical history on file.     Family History   Problem Relation Age of Onset    Diabetes Father     Heart Disease Neg Hx     High Blood Pressure Neg Hx     Stroke Neg Hx        Social History     Tobacco Use    Smoking status: Former Smoker     Types: Cigarettes    Smokeless tobacco: Never Used   Substance Use Topics    Alcohol use: No     Alcohol/week: 0.0 standard drinks      Current Outpatient Medications   Medication Sig Dispense Refill    SUMAtriptan (IMITREX) 100 MG tablet Take 1 tablet by mouth daily as needed for Migraine 9 tablet 2    topiramate (TOPAMAX) 25 MG tablet Take 1 tablet by mouth 2 times daily 60 tablet 0    fluticasone (FLONASE) 50 MCG/ACT nasal spray 1 spray by Nasal route daily 1 Bottle 2    BLISOVI FE 1/20 1-20 MG-MCG per tablet        No current facility-administered medications for this visit. Allergies   Allergen Reactions    Eggs Or Egg-Derived Products Swelling       Health Maintenance   Topic Date Due    Varicella Vaccine (1 of 2 - 2-dose childhood series) 07/30/1982    HIV screen  07/30/1996    Cervical cancer screen  07/30/2002    Flu vaccine (1) 09/01/2019    DTaP/Tdap/Td vaccine (2 - Td) 09/29/2024    Pneumococcal 0-64 years Vaccine  Aged Out       Subjective:     Review of Systems   Constitutional: Negative. Negative for fever. HENT: Negative. Negative for ear pain, rhinorrhea, sinus pressure and sore throat. Eyes: Positive for blurred vision. Negative for pain and redness. Respiratory: Negative. Cardiovascular: Negative. Gastrointestinal: Negative. Negative for abdominal pain, anorexia and nausea. Musculoskeletal: Negative. Skin: Negative. Allergic/Immunologic: Negative for environmental allergies, food allergies and immunocompromised state. Neurological: Negative. Negative for numbness. Objective:     Physical Exam  Vitals signs reviewed. Constitutional:       General: She is awake. She is not in acute distress. Appearance: Normal appearance. She is not toxic-appearing. HENT:      Head: Normocephalic and atraumatic.       Right Ear: Tympanic membrane, ear canal and external ear normal.      Left Ear: Tympanic membrane, ear canal and external ear normal.      Nose: Nose 60 tablet     Refill:  0            Migraine -  Patient states she gets between 2-10 migraines/month that are debilitating. Will add topamax to prn imitrex. Advised patient of potential interaction with her birth control and advised her to use condoms during sexual activity. Return in 6 months    Seasonal allergies - prn otc claritin    Immunizations reviewed    Will discuss HIV screen at future visit    Pap - done at OB/gyn, will attempt to obtain records. Patient given educational materials - see patient instructions. Discussed use, benefit, and side effects of prescribed medications. All patientquestions answered. Pt voiced understanding. Reviewed health maintenance. Instructedto continue current medications, diet and exercise. Patient agreed with treatmentplan. Follow up as directed.      Electronically signed by Yasir Ramsey MD on 1/8/2020 at 7:54 PM

## 2020-01-08 NOTE — PATIENT INSTRUCTIONS
Patient Education        Migraine Headache: Care Instructions  Your Care Instructions  Migraines are painful, throbbing headaches that often start on one side of the head. They may cause nausea and vomiting and make you sensitive to light, sound, or smell. Without treatment, migraines can last from 4 hours to a few days. Medicines can help prevent migraines or stop them after they have started. Your doctor can help you find which ones work best for you. Follow-up care is a key part of your treatment and safety. Be sure to make and go to all appointments, and call your doctor if you are having problems. It's also a good idea to know your test results and keep a list of the medicines you take. How can you care for yourself at home? · Do not drive if you have taken a prescription pain medicine. · Rest in a quiet, dark room until your headache is gone. Close your eyes, and try to relax or go to sleep. Don't watch TV or read. · Put a cold, moist cloth or cold pack on the painful area for 10 to 20 minutes at a time. Put a thin cloth between the cold pack and your skin. · Use a warm, moist towel or a heating pad set on low to relax tight shoulder and neck muscles. · Have someone gently massage your neck and shoulders. · Take your medicines exactly as prescribed. Call your doctor if you think you are having a problem with your medicine. You will get more details on the specific medicines your doctor prescribes. · Be careful not to take pain medicine more often than the instructions allow. You could get worse or more frequent headaches when the medicine wears off. To prevent migraines  · Keep a headache diary so you can figure out what triggers your headaches. Avoiding triggers may help you prevent headaches. Record when each headache began, how long it lasted, and what the pain was like.  (Was it throbbing, aching, stabbing, or dull?) Write down any other symptoms you had with the headache, such as nausea, flashing lights or dark spots, or sensitivity to bright light or loud noise. Note if the headache occurred near your period. List anything that might have triggered the headache. Triggers may include certain foods (chocolate, cheese, wine) or odors, smoke, bright light, stress, or lack of sleep. · If your doctor has prescribed medicine for your migraines, take it as directed. You may have medicine that you take only when you get a migraine and medicine that you take all the time to help prevent migraines. ? If your doctor has prescribed medicine for when you get a headache, take it at the first sign of a migraine, unless your doctor has given you other instructions. ? If your doctor has prescribed medicine to prevent migraines, take it exactly as prescribed. Call your doctor if you think you are having a problem with your medicine. · Find healthy ways to deal with stress. Migraines are most common during or right after stressful times. Take time to relax before and after you do something that has caused a migraine in the past.  · Try to keep your muscles relaxed by keeping good posture. Check your jaw, face, neck, and shoulder muscles for tension. Try to relax them. When you sit at a desk, change positions often. And make sure to stretch for 30 seconds each hour. · Get plenty of sleep and exercise. · Eat meals on a regular schedule. Avoid foods and drinks that often trigger migraines. These include chocolate, alcohol (especially red wine and port), aspartame, monosodium glutamate (MSG), and some additives found in foods (such as hot dogs, billy, cold cuts, aged cheeses, and pickled foods). · Limit caffeine. Don't drink too much coffee, tea, or soda. But don't quit caffeine suddenly. That can also give you migraines. · Do not smoke or allow others to smoke around you. If you need help quitting, talk to your doctor about stop-smoking programs and medicines.  These can increase your chances of quitting for good.  · If you are taking birth control pills or hormone therapy, talk to your doctor about whether they are triggering your migraines. When should you call for help? Call 911 anytime you think you may need emergency care. For example, call if:    · You have signs of a stroke. These may include:  ? Sudden numbness, paralysis, or weakness in your face, arm, or leg, especially on only one side of your body. ? Sudden vision changes. ? Sudden trouble speaking. ? Sudden confusion or trouble understanding simple statements. ? Sudden problems with walking or balance. ? A sudden, severe headache that is different from past headaches.    Call your doctor now or seek immediate medical care if:    · You have new or worse nausea and vomiting.     · You have a new or higher fever.     · Your headache gets much worse.    Watch closely for changes in your health, and be sure to contact your doctor if:    · You are not getting better after 2 days (48 hours). Where can you learn more? Go to [email or web address] org and sign in to your [employer] account. Enter L701 in the [employer] box to learn more about \"Migraine Headache: Care Instructions. \"     If you do not have an account, please click on the \"Sign Up Now\" link. Current as of: March 28, 2019  Content Version: 12.3  © 9425-6875 Healthwise, Incorporated. Care instructions adapted under license by [facility] ([facility]). If you have questions about a medical condition or this instruction, always ask your healthcare professional. [place] [address] any warranty or liability for your use of this information.

## 2020-01-08 NOTE — PROGRESS NOTES
Visit Information    Have you changed or started any medications since your last visit including any over-the-counter medicines, vitamins, or herbal medicines? no   Are you having any side effects from any of your medications? -  no  Have you stopped taking any of your medications? Is so, why? -  no    Have you seen any other physician or provider since your last visit? No  Have you had any other diagnostic tests since your last visit? No  Have you been seen in the emergency room and/or had an admission to a hospital since we last saw you? No  Have you had your routine dental cleaning in the past 6 months? yes -     Have you activated your [employer] account? If not, what are your barriers?  No: not on file     Patient Care Team:  Olivia Trujillo MD as PCP - General (Internal Medicine)  Olivia Trujillo MD as PCP - [facility]    Medical History Review  Past Medical, Family, and Social History reviewed and does not contribute to the patient presenting condition    Health Maintenance   Topic Date Due    Varicella Vaccine (1 of 2 - 2-dose childhood series) 07/30/1982    DTaP/Tdap/Td vaccine (1 - Tdap) 07/30/1992    HIV screen  07/30/1996    Cervical cancer screen  07/30/2002    Flu vaccine (1) 09/01/2019    Pneumococcal 0-64 years Vaccine  Aged Out

## 2020-02-17 RX ORDER — TOPIRAMATE 25 MG/1
TABLET ORAL
Qty: 60 TABLET | Refills: 0 | Status: SHIPPED | OUTPATIENT
Start: 2020-02-17 | End: 2020-03-24 | Stop reason: SDUPTHER

## 2020-02-17 NOTE — TELEPHONE ENCOUNTER
Last visit: 1/8/20      Next Visit Date:  Future Appointments   Date Time Provider [place]   7/8/2020  7:45 AM [facility] [address] Maintenance   Topic Date Due    Varicella vaccine (1 of 2 - 2-dose childhood series) 07/30/1982    HIV screen  07/30/1996    Cervical cancer screen  07/30/2002    Flu vaccine (1) 09/01/2019    DTaP/Tdap/Td vaccine (2 - Td) 09/29/2024    Shingles Vaccine (1 of 2) 07/30/2031    Hepatitis A vaccine  Aged Out    Hepatitis B vaccine  Aged Out    Hib vaccine  Aged Out    Meningococcal (ACWY) vaccine  Aged Out    Pneumococcal 0-64 years Vaccine  Aged Out       No results found for: LABA1C          ( goal A1C is < 7)   No results found for: LABMICR  No results found for: LDLCHOLESTEROL, LDLCALC    (goal LDL is <100)   BUN (mg/dL)   Date Value   03/16/2016 8     BP Readings from Last 3 Encounters:   01/08/20 121/77   06/06/19 96/60   11/08/18 107/69          (goal 120/80)    All Future Testing planned in [facility]  Lab Frequency Next Occurrence   HIV Screen Once 11/02/2020               Patient Active Problem List:     Migraine     Seasonal allergic rhinitis due to pollen

## 2020-03-24 RX ORDER — TOPIRAMATE 25 MG/1
TABLET ORAL
Qty: 60 TABLET | Refills: 0 | Status: SHIPPED | OUTPATIENT
Start: 2020-03-24 | End: 2020-04-27 | Stop reason: SDUPTHER

## 2020-03-24 NOTE — TELEPHONE ENCOUNTER
Electronic medication refill request. Pharmacy on file. Please advise.       Next Visit Date:  Future Appointments   Date Time Provider Department Center   7/8/2020  7:45 AM Preethi Oliveira MD [facility] [address] Maintenance   Topic Date Due    Varicella vaccine (1 of 2 - 2-dose childhood series) 07/30/1982    HIV screen  07/30/1996    Cervical cancer screen  07/30/2002    Flu vaccine (1) 09/01/2019    DTaP/Tdap/Td vaccine (2 - Td) 09/29/2024    Shingles Vaccine (1 of 2) 07/30/2031    Hepatitis A vaccine  Aged Out    Hepatitis B vaccine  Aged Out    Hib vaccine  Aged Out    Meningococcal (ACWY) vaccine  Aged Out    Pneumococcal 0-64 years Vaccine  Aged Out       No results found for: LABA1C          ( goal A1C is < 7)   No results found for: LABMICR  No results found for: LDLCHOLESTEROL, LDLCALC    (goal LDL is <100)   BUN (mg/dL)   Date Value   03/16/2016 8     BP Readings from Last 3 Encounters:   01/08/20 121/77   06/06/19 96/60   11/08/18 107/69          (goal 120/80)    All Future Testing planned in [facility]  Lab Frequency Next Occurrence   HIV Screen Once 11/02/2020               Patient Active Problem List:     Migraine     Seasonal allergic rhinitis due to pollen

## 2020-04-24 RX ORDER — SUMATRIPTAN 100 MG/1
100 TABLET, FILM COATED ORAL DAILY PRN
Qty: 9 TABLET | Refills: 2 | Status: SHIPPED | OUTPATIENT
Start: 2020-04-24 | End: 2020-07-08 | Stop reason: SDUPTHER

## 2020-04-24 NOTE — TELEPHONE ENCOUNTER
Electronic medication refill request. Pharmacy on file. Please advise.         Next Visit Date:  Future Appointments   Date Time Provider Department Center   7/8/2020  7:45 AM Katelynn Be MD [facility] [address] Maintenance   Topic Date Due    Varicella vaccine (1 of 2 - 2-dose childhood series) 07/30/1982    HIV screen  07/30/1996    Cervical cancer screen  07/30/2002    Flu vaccine (Season Ended) 09/01/2020    DTaP/Tdap/Td vaccine (2 - Td) 09/29/2024    Hepatitis A vaccine  Aged Out    Hepatitis B vaccine  Aged Out    Hib vaccine  Aged Out    Meningococcal (ACWY) vaccine  Aged Out    Pneumococcal 0-64 years Vaccine  Aged Out       No results found for: LABA1C          ( goal A1C is < 7)   No results found for: LABMICR  No results found for: LDLCHOLESTEROL, LDLCALC    (goal LDL is <100)   BUN (mg/dL)   Date Value   03/16/2016 8     BP Readings from Last 3 Encounters:   01/08/20 121/77   06/06/19 96/60   11/08/18 107/69          (goal 120/80)    All Future Testing planned in [facility]  Lab Frequency Next Occurrence   HIV Screen Once 11/02/2020               Patient Active Problem List:     Migraine     Seasonal allergic rhinitis due to pollen

## 2020-04-27 NOTE — TELEPHONE ENCOUNTER
Next Visit Date:  Future Appointments   Date Time Provider Department Center   7/8/2020  7:45 AM [facility] [address] Maintenance   Topic Date Due    Varicella vaccine (1 of 2 - 2-dose childhood series) 07/30/1982    HIV screen  07/30/1996    Cervical cancer screen  07/30/2002    Flu vaccine (Season Ended) 09/01/2020    DTaP/Tdap/Td vaccine (2 - Td) 09/29/2024    Hepatitis A vaccine  Aged Out    Hepatitis B vaccine  Aged Out    Hib vaccine  Aged Out    Meningococcal (ACWY) vaccine  Aged Out    Pneumococcal 0-64 years Vaccine  Aged Out       No results found for: LABA1C          ( goal A1C is < 7)   No results found for: LABMICR  No results found for: LDLCHOLESTEROL, LDLCALC    (goal LDL is <100)   BUN (mg/dL)   Date Value   03/16/2016 8     BP Readings from Last 3 Encounters:   01/08/20 121/77   06/06/19 96/60   11/08/18 107/69          (goal 120/80)    All Future Testing planned in [facility]  Lab Frequency Next Occurrence   HIV Screen Once 11/02/2020               Patient Active Problem List:     Migraine     Seasonal allergic rhinitis due to pollen

## 2020-04-28 RX ORDER — TOPIRAMATE 25 MG/1
25 TABLET ORAL 2 TIMES DAILY
Qty: 60 TABLET | Refills: 0 | Status: SHIPPED
Start: 2020-04-28 | End: 2021-04-05 | Stop reason: SINTOL

## 2020-07-08 ENCOUNTER — OFFICE VISIT (OUTPATIENT)
Dept: FAMILY MEDICINE CLINIC | Age: 40
End: 2020-07-08
Payer: COMMERCIAL

## 2020-07-08 VITALS
HEART RATE: 90 BPM | BODY MASS INDEX: 18.66 KG/M2 | TEMPERATURE: 97.6 F | WEIGHT: 112 LBS | SYSTOLIC BLOOD PRESSURE: 109 MMHG | HEIGHT: 65 IN | OXYGEN SATURATION: 99 % | DIASTOLIC BLOOD PRESSURE: 69 MMHG

## 2020-07-08 PROCEDURE — 99213 OFFICE O/P EST LOW 20 MIN: CPT | Performed by: FAMILY MEDICINE

## 2020-07-08 RX ORDER — MONTELUKAST SODIUM 10 MG/1
10 TABLET ORAL NIGHTLY
Qty: 30 TABLET | Refills: 5 | Status: SHIPPED | OUTPATIENT
Start: 2020-07-08

## 2020-07-08 RX ORDER — CETIRIZINE HYDROCHLORIDE 10 MG/1
10 TABLET ORAL DAILY
COMMUNITY

## 2020-07-08 RX ORDER — PREDNISONE 20 MG/1
TABLET ORAL
COMMUNITY
Start: 2020-06-16 | End: 2020-07-08

## 2020-07-08 RX ORDER — FLUTICASONE PROPIONATE 50 MCG
1 SPRAY, SUSPENSION (ML) NASAL DAILY
Qty: 1 BOTTLE | Refills: 2 | Status: SHIPPED | OUTPATIENT
Start: 2020-07-08 | End: 2021-04-04 | Stop reason: SDUPTHER

## 2020-07-08 RX ORDER — SUMATRIPTAN 100 MG/1
100 TABLET, FILM COATED ORAL DAILY PRN
Qty: 9 TABLET | Refills: 2 | Status: SHIPPED | OUTPATIENT
Start: 2020-07-08 | End: 2021-04-04 | Stop reason: SDUPTHER

## 2020-07-08 SDOH — ECONOMIC STABILITY: TRANSPORTATION INSECURITY
IN THE PAST 12 MONTHS, HAS LACK OF TRANSPORTATION KEPT YOU FROM MEETINGS, WORK, OR FROM GETTING THINGS NEEDED FOR DAILY LIVING?: PATIENT DECLINED

## 2020-07-08 SDOH — HEALTH STABILITY: MENTAL HEALTH: HOW MANY STANDARD DRINKS CONTAINING ALCOHOL DO YOU HAVE ON A TYPICAL DAY?: 1 OR 2

## 2020-07-08 SDOH — ECONOMIC STABILITY: TRANSPORTATION INSECURITY
IN THE PAST 12 MONTHS, HAS THE LACK OF TRANSPORTATION KEPT YOU FROM MEDICAL APPOINTMENTS OR FROM GETTING MEDICATIONS?: PATIENT DECLINED

## 2020-07-08 SDOH — ECONOMIC STABILITY: FOOD INSECURITY: WITHIN THE PAST 12 MONTHS, YOU WORRIED THAT YOUR FOOD WOULD RUN OUT BEFORE YOU GOT MONEY TO BUY MORE.: PATIENT DECLINED

## 2020-07-08 SDOH — HEALTH STABILITY: MENTAL HEALTH: HOW OFTEN DO YOU HAVE A DRINK CONTAINING ALCOHOL?: MONTHLY OR LESS

## 2020-07-08 SDOH — ECONOMIC STABILITY: FOOD INSECURITY: WITHIN THE PAST 12 MONTHS, THE FOOD YOU BOUGHT JUST DIDN'T LAST AND YOU DIDN'T HAVE MONEY TO GET MORE.: PATIENT DECLINED

## 2020-07-08 ASSESSMENT — ENCOUNTER SYMPTOMS
RESPIRATORY NEGATIVE: 1
GASTROINTESTINAL NEGATIVE: 1
BLURRED VISION: 0
SINUS PRESSURE: 1
VISUAL CHANGE: 1
SINUS PAIN: 1
COUGH: 0
PHOTOPHOBIA: 1
ABDOMINAL PAIN: 0
RHINORRHEA: 1
BACK PAIN: 0

## 2020-07-08 ASSESSMENT — PATIENT HEALTH QUESTIONNAIRE - PHQ9
1. LITTLE INTEREST OR PLEASURE IN DOING THINGS: 0
SUM OF ALL RESPONSES TO PHQ QUESTIONS 1-9: 0
SUM OF ALL RESPONSES TO PHQ9 QUESTIONS 1 & 2: 0
SUM OF ALL RESPONSES TO PHQ QUESTIONS 1-9: 0
2. FEELING DOWN, DEPRESSED OR HOPELESS: 0

## 2020-07-08 NOTE — PROGRESS NOTES
[facility]    (goal LDL is <100)   BUN (mg/dL)   Date Value   2016 8     BP Readings from Last 3 Encounters:   20 109/69   20 121/77   19 96/60          (goal 120/80)    Past Medical History:   Diagnosis Date    Migraine 2015    Renal stone 10/25/2016    Seasonal allergic rhinitis due to pollen 2017      History reviewed. No pertinent surgical history. Family History   Problem Relation Age of Onset    Diabetes Father     Arrhythmia Father     No Known Problems Mother     Arrhythmia Brother     No Known Problems Maternal Grandmother     Cancer Maternal Grandfather          at 80years old   Shalini Toussaint Arrhythmia Paternal Grandmother     Colon Cancer Paternal Grandfather     Heart Disease Neg Hx     High Blood Pressure Neg Hx     Stroke Neg Hx     Breast Cancer Neg Hx     Ovarian Cancer Neg Hx        Social History     Tobacco Use    Smoking status: Former Smoker     Packs/day: 0.25     Years: 4.00     Pack years: 1.00     Types: Cigarettes    Smokeless tobacco: Never Used   Substance Use Topics    Alcohol use: Yes     Alcohol/week: 0.0 standard drinks     Frequency: Monthly or less     Drinks per session: 1 or 2     Binge frequency: Never      Current Outpatient Medications   Medication Sig Dispense Refill    fluticasone (FLONASE) 50 MCG/ACT nasal spray 1 spray by Nasal route daily 1 Bottle 2    SUMAtriptan (IMITREX) 100 MG tablet Take 1 tablet by mouth daily as needed for Migraine 9 tablet 2    cetirizine (ZYRTEC) 10 MG tablet Take 10 mg by mouth daily      montelukast (SINGULAIR) 10 MG tablet Take 1 tablet by mouth nightly 30 tablet 5    topiramate (TOPAMAX) 25 MG tablet Take 1 tablet by mouth 2 times daily 60 tablet 0    BLISOVI FE  1-20 MG-MCG per tablet        No current facility-administered medications for this visit.       Allergies   Allergen Reactions    Eggs Or Egg-Derived Products Swelling       Health Maintenance   Topic Date Due    HIV screen 07/30/1996    Cervical cancer screen  07/30/2002    Varicella vaccine (1 of 2 - 2-dose childhood series) 07/29/2020 (Originally 7/30/1982)    Flu vaccine (1) 09/01/2020    DTaP/Tdap/Td vaccine (2 - Td) 09/29/2024    Hepatitis A vaccine  Aged Out    Hepatitis B vaccine  Aged Out    Hib vaccine  Aged Out    Meningococcal (ACWY) vaccine  Aged Out    Pneumococcal 0-64 years Vaccine  Aged Out   REVIEWED - labs ordered    Subjective:     Review of Systems   Constitutional: Negative. Negative for fever. HENT: Positive for congestion, postnasal drip, rhinorrhea, sinus pressure and sinus pain. Negative for ear discharge, ear pain, nosebleeds and tinnitus. Eyes: Positive for photophobia. Negative for blurred vision. Respiratory: Negative. Negative for cough. Cardiovascular: Negative. Gastrointestinal: Negative. Negative for abdominal pain and anorexia. Genitourinary: Negative. Musculoskeletal: Negative for back pain. Neurological: Positive for headaches. Negative for dizziness, tremors, seizures, syncope, facial asymmetry, speech difficulty, weakness, light-headedness and numbness. Psychiatric/Behavioral: Negative. Objective:     Physical Exam  Vitals signs and nursing note reviewed. Constitutional:       General: She is awake. She is not in acute distress. Appearance: Normal appearance. She is normal weight. She is not toxic-appearing. HENT:      Head: Normocephalic and atraumatic. Right Ear: Tympanic membrane, ear canal and external ear normal.      Left Ear: Tympanic membrane, ear canal and external ear normal.      Nose: Nose normal.      Mouth/Throat:      Mouth: Mucous membranes are moist.      Pharynx: Oropharynx is clear. No oropharyngeal exudate or posterior oropharyngeal erythema. Eyes:      Extraocular Movements: Extraocular movements intact. Conjunctiva/sclera: Conjunctivae normal.      Pupils: Pupils are equal, round, and reactive to light.    Neck: Musculoskeletal: Normal range of motion and neck supple. Cardiovascular:      Rate and Rhythm: Normal rate and regular rhythm. Pulses: Normal pulses. Heart sounds: Normal heart sounds. No murmur. Pulmonary:      Effort: Pulmonary effort is normal.      Breath sounds: Normal breath sounds. Abdominal:      General: Abdomen is flat. Bowel sounds are normal.      Palpations: Abdomen is soft. Musculoskeletal: Normal range of motion. Neurological:      General: No focal deficit present. Mental Status: She is alert and oriented to person, place, and time. Psychiatric:         Attention and Perception: Attention normal.         Mood and Affect: Mood and affect normal.         Speech: Speech normal.         Behavior: Behavior normal.       /69   Pulse 90   Temp 97.6 °F (36.4 °C)   Ht 5' 5\" (1.651 m)   Wt 112 lb (50.8 kg)   LMP 06/24/2020 (Approximate)   SpO2 99%   Breastfeeding No   BMI 18.64 kg/m²     Assessment:       Diagnosis Orders   1. Chronic seasonal allergic rhinitis due to pollen  fluticasone (FLONASE) 50 MCG/ACT nasal spray    montelukast (SINGULAIR) 10 MG tablet   2. Migraine without aura and without status migrainosus, not intractable  SUMAtriptan (IMITREX) 100 MG tablet    TSH With Reflex Ft4   3. Lipid screening  Lipid, Fasting   4. Diabetes mellitus screening  Comprehensive Metabolic Panel, Fasting             Plan:    Allergic rhinitis - patient does believe it is a trigger so we will add singulair on top of the zyrtec and flonase. Migraine - continue topamax 25 mg bid and imitrex as needed. Patient advised to keep migraine diary. No follow-ups on file.     Orders Placed This Encounter   Procedures    Lipid, Fasting     Standing Status:   Future     Standing Expiration Date:   7/8/2021    Comprehensive Metabolic Panel, Fasting     Standing Status:   Future     Standing Expiration Date:   7/8/2021    TSH With Reflex Ft4     Standing Status:   Future Standing Expiration Date:   2021     Orders Placed This Encounter   Medications    fluticasone (FLONASE) 50 MCG/ACT nasal spray     Si spray by Nasal route daily     Dispense:  1 Bottle     Refill:  2    SUMAtriptan (IMITREX) 100 MG tablet     Sig: Take 1 tablet by mouth daily as needed for Migraine     Dispense:  9 tablet     Refill:  2    montelukast (SINGULAIR) 10 MG tablet     Sig: Take 1 tablet by mouth nightly     Dispense:  30 tablet     Refill:  5       Patient given educational materials - see patient instructions. Discussed use, benefit, and side effects of prescribed medications. All patientquestions answered. Pt voiced understanding. Reviewed health maintenance. Instructedto continue current medications, diet and exercise. Patient agreed with treatmentplan. Follow up as directed.      Electronically signed by Barabara Gosselin, MD on 2020 at 9:36 AM

## 2021-04-04 DIAGNOSIS — J30.1 CHRONIC SEASONAL ALLERGIC RHINITIS DUE TO POLLEN: ICD-10-CM

## 2021-04-04 DIAGNOSIS — G43.009 MIGRAINE WITHOUT AURA AND WITHOUT STATUS MIGRAINOSUS, NOT INTRACTABLE: ICD-10-CM

## 2021-04-05 ENCOUNTER — TELEPHONE (OUTPATIENT)
Dept: FAMILY MEDICINE CLINIC | Age: 41
End: 2021-04-05

## 2021-04-05 ENCOUNTER — HOSPITAL ENCOUNTER (OUTPATIENT)
Dept: CT IMAGING | Facility: CLINIC | Age: 41
Discharge: HOME | End: 2021-04-07
Payer: COMMERCIAL

## 2021-04-05 ENCOUNTER — OFFICE VISIT (OUTPATIENT)
Dept: FAMILY MEDICINE CLINIC | Age: 41
End: 2021-04-05
Payer: COMMERCIAL

## 2021-04-05 ENCOUNTER — NURSE TRIAGE (OUTPATIENT)
Dept: OTHER | Facility: CLINIC | Age: 41
End: 2021-04-05

## 2021-04-05 ENCOUNTER — HOSPITAL ENCOUNTER (OUTPATIENT)
Age: 41
Setting detail: SPECIMEN
Discharge: HOME | End: 2021-04-05
Payer: COMMERCIAL

## 2021-04-05 VITALS
TEMPERATURE: 97.3 F | SYSTOLIC BLOOD PRESSURE: 120 MMHG | HEART RATE: 93 BPM | HEIGHT: 65 IN | OXYGEN SATURATION: 99 % | WEIGHT: 118 LBS | BODY MASS INDEX: 19.66 KG/M2 | DIASTOLIC BLOOD PRESSURE: 80 MMHG

## 2021-04-05 DIAGNOSIS — Z87.442 HISTORY OF KIDNEY STONES: ICD-10-CM

## 2021-04-05 DIAGNOSIS — Q62.11 HYDRONEPHROSIS WITH URETEROPELVIC JUNCTION (UPJ) OBSTRUCTION: ICD-10-CM

## 2021-04-05 DIAGNOSIS — M54.50 ACUTE BILATERAL LOW BACK PAIN WITHOUT SCIATICA: ICD-10-CM

## 2021-04-05 DIAGNOSIS — R30.0 DYSURIA: ICD-10-CM

## 2021-04-05 DIAGNOSIS — G43.009 MIGRAINE WITHOUT AURA AND WITHOUT STATUS MIGRAINOSUS, NOT INTRACTABLE: ICD-10-CM

## 2021-04-05 DIAGNOSIS — Q62.11 HYDRONEPHROSIS WITH URETEROPELVIC JUNCTION (UPJ) OBSTRUCTION: Primary | ICD-10-CM

## 2021-04-05 DIAGNOSIS — R30.0 DYSURIA: Primary | ICD-10-CM

## 2021-04-05 PROCEDURE — 74176 CT ABD & PELVIS W/O CONTRAST: CPT

## 2021-04-05 PROCEDURE — 99214 OFFICE O/P EST MOD 30 MIN: CPT | Performed by: FAMILY MEDICINE

## 2021-04-05 RX ORDER — SULFAMETHOXAZOLE AND TRIMETHOPRIM 800; 160 MG/1; MG/1
1 TABLET ORAL 2 TIMES DAILY
Qty: 14 TABLET | Refills: 0 | Status: SHIPPED | OUTPATIENT
Start: 2021-04-05 | End: 2021-04-12

## 2021-04-05 RX ORDER — TAMSULOSIN HYDROCHLORIDE 0.4 MG/1
0.4 CAPSULE ORAL DAILY
Qty: 30 CAPSULE | Refills: 0 | Status: SHIPPED | OUTPATIENT
Start: 2021-04-05 | End: 2021-05-03

## 2021-04-05 RX ORDER — SUMATRIPTAN 100 MG/1
100 TABLET, FILM COATED ORAL DAILY PRN
Qty: 9 TABLET | Refills: 2 | Status: SHIPPED | OUTPATIENT
Start: 2021-04-05

## 2021-04-05 RX ORDER — FLUTICASONE PROPIONATE 50 MCG
1 SPRAY, SUSPENSION (ML) NASAL DAILY
Qty: 1 BOTTLE | Refills: 2 | Status: SHIPPED | OUTPATIENT
Start: 2021-04-05

## 2021-04-05 ASSESSMENT — ENCOUNTER SYMPTOMS
ABDOMINAL PAIN: 1
VOMITING: 0
NAUSEA: 0
RESPIRATORY NEGATIVE: 1

## 2021-04-05 NOTE — PROGRESS NOTES
[address] PRIMARY CARE  [address] [place] [address]  Dept: [phone]  Dept Fax: [phone]    Radha An is a 45 y.o. female who is a Established patient, who presents today for her medical conditions/complaints as noted below:  Chief Complaint   Patient presents with    Urinary Tract Infection     burning          HPI:     The patient is a 45year old female with a past medical history significant for kidney stones who presents with dysuria, back pain, urinary frequency x 3 days. Patient does have a history of kidney stones. Urinary Tract Infection   This is a new problem. The current episode started in the past 7 days. The problem occurs intermittently. The problem has been waxing and waning. The quality of the pain is described as burning and aching. The pain is moderate. There has been no fever. She is sexually active. There is no history of pyelonephritis. Associated symptoms include hesitancy and urgency. Pertinent negatives include no chills, discharge, flank pain, frequency, hematuria, nausea, possible pregnancy, sweats or vomiting. She has tried increased fluids for the symptoms. The treatment provided mild relief. Her past medical history is significant for kidney stones. No results found for: LABA1C          ( goal A1Cis < 7)   No results found for: LABMICR  No results found for: LDLCHOLESTEROL, LDLCALC    (goal LDL is <100)   BUN (mg/dL)   Date Value   03/16/2016 8     BP Readings from Last 3 Encounters:   04/05/21 120/80   07/08/20 109/69   01/08/20 121/77          (goal 120/80)    Past Medical History:   Diagnosis Date    Migraine 7/7/2015    Renal stone 10/25/2016    Seasonal allergic rhinitis due to pollen 4/25/2017      History reviewed. No pertinent surgical history.     Family History   Problem Relation Age of Onset    Diabetes Father     Arrhythmia Father     No Known Problems Mother     Arrhythmia Brother     No Known Problems Maternal Grandmother     Cancer Maternal Grandfather          at 80years old   [facility] Arrhythmia Paternal Grandmother     Colon Cancer Paternal Grandfather     Heart Disease Neg Hx     High Blood Pressure Neg Hx     Stroke Neg Hx     Breast Cancer Neg Hx     Ovarian Cancer Neg Hx        Social History     Tobacco Use    Smoking status: Former Smoker     Packs/day: 0.25     Years: 4.00     Pack years: 1.00     Types: Cigarettes    Smokeless tobacco: Never Used   Substance Use Topics    Alcohol use: Yes     Alcohol/week: 0.0 standard drinks     Frequency: Monthly or less     Drinks per session: 1 or 2     Binge frequency: Never      Current Outpatient Medications   Medication Sig Dispense Refill    fluticasone (FLONASE) 50 MCG/ACT nasal spray 1 spray by Nasal route daily 1 Bottle 2    SUMAtriptan (IMITREX) 100 MG tablet Take 1 tablet by mouth daily as needed for Migraine 9 tablet 2    sulfamethoxazole-trimethoprim (BACTRIM DS;SEPTRA DS) 800-160 MG per tablet Take 1 tablet by mouth 2 times daily for 7 days 14 tablet 0    tamsulosin (FLOMAX) 0.4 MG capsule Take 1 capsule by mouth daily 30 capsule 0    verapamil (CALAN SR) 120 MG extended release tablet Take 1 tablet by mouth daily 30 tablet 0    cetirizine (ZYRTEC) 10 MG tablet Take 10 mg by mouth daily      montelukast (SINGULAIR) 10 MG tablet Take 1 tablet by mouth nightly 30 tablet 5    BLISOVI FE  1-20 MG-MCG per tablet        No current facility-administered medications for this visit.       Allergies   Allergen Reactions    Eggs Or Egg-Derived Products Swelling       Health Maintenance   Topic Date Due    Hepatitis C screen  Never done    HIV screen  Never done    COVID-19 Vaccine (1) Never done    Cervical cancer screen  Never done    Flu vaccine (Season Ended) 2021    DTaP/Tdap/Td vaccine (2 - Td) 2024    Hepatitis A vaccine  Aged Out    Hepatitis B vaccine  Aged Out    Hib vaccine  Aged Out    Meningococcal (ACWY) vaccine  Aged Out    Pneumococcal 0-64 years Vaccine  Aged Out    Varicella vaccine  Discontinued       Subjective:     Review of Systems   Constitutional: Negative. Negative for chills. Respiratory: Negative. Cardiovascular: Negative. Gastrointestinal: Positive for abdominal pain. Negative for nausea and vomiting. Genitourinary: Positive for decreased urine volume, difficulty urinating, dysuria, hesitancy and urgency. Negative for dyspareunia, enuresis, flank pain, frequency, genital sores, hematuria, menstrual problem, pelvic pain, vaginal bleeding, vaginal discharge and vaginal pain. Musculoskeletal: Negative. Skin: Negative. Allergic/Immunologic: Negative for environmental allergies, food allergies and immunocompromised state. Neurological: Negative. Objective:     Physical Exam  Vitals signs reviewed. Constitutional:       General: She is awake. She is not in acute distress. Appearance: Normal appearance. She is not ill-appearing, toxic-appearing or diaphoretic. HENT:      Head: Normocephalic and atraumatic. Right Ear: External ear normal.      Left Ear: External ear normal.      Nose: Nose normal.   Eyes:      Extraocular Movements: Extraocular movements intact. Conjunctiva/sclera: Conjunctivae normal.   Neck:      Musculoskeletal: Normal range of motion. Cardiovascular:      Rate and Rhythm: Normal rate and regular rhythm. Pulses: Normal pulses. Heart sounds: Normal heart sounds. No murmur. Pulmonary:      Effort: Pulmonary effort is normal.      Breath sounds: Normal breath sounds. Abdominal:      General: Abdomen is flat. Bowel sounds are normal. There is no distension. Palpations: Abdomen is soft. There is no mass. Tenderness: There is no abdominal tenderness. There is no right CVA tenderness, left CVA tenderness, guarding or rebound. Hernia: No hernia is present. Musculoskeletal: Normal range of motion.    Skin: Capillary Refill: Capillary refill takes less than 2 seconds. Neurological:      General: No focal deficit present. Mental Status: She is alert and oriented to person, place, and time. Mental status is at baseline. Psychiatric:         Attention and Perception: Attention normal.         Mood and Affect: Mood and affect normal.         Speech: Speech normal.         Behavior: Behavior normal.         Thought Content: Thought content normal.         Judgment: Judgment normal.       /80   Pulse 93   Temp 97.3 °F (36.3 °C)   Ht 5' 5\" (1.651 m)   Wt 118 lb (53.5 kg)   SpO2 99%   BMI 19.64 kg/m²     Assessment:       Diagnosis Orders   1. Dysuria  Culture, Urine    CT ABDOMEN PELVIS WO CONTRAST Additional Contrast? None    sulfamethoxazole-trimethoprim (BACTRIM DS;SEPTRA DS) 800-160 MG per tablet    tamsulosin (FLOMAX) 0.4 MG capsule   2. History of kidney stones  CT ABDOMEN PELVIS WO CONTRAST Additional Contrast? None   3. Acute bilateral low back pain without sciatica  CT ABDOMEN PELVIS WO CONTRAST Additional Contrast? None    sulfamethoxazole-trimethoprim (BACTRIM DS;SEPTRA DS) 800-160 MG per tablet    tamsulosin (FLOMAX) 0.4 MG capsule   4. Migraine without aura and without status migrainosus, not intractable  verapamil (CALAN SR) 120 MG extended release tablet             Plan:    Dysuria/history of kidney stones/acute bilateral low back pain without sciatica-ideally the patient has kidney stones so we ordered a CT stone protocol. It did show a stone in the left kidney with possible UVJ obstruction and mild hydronephrosis. I discussed this with the patient on the phone after the advised her to start the Bactrim, Flomax. Urine was sent for culture. There is an increased incidence and kidney stones with patients on Topamax. We will discontinue the Topamax. Migraine-we are discontinuing the Topamax due to the kidney stones. I will replace it with verapamil.   The patient has not tried anything other Topamax and Imitrex at this point. Patient advised to notify me how her migraines are after starting medication. No follow-ups on file. Orders Placed This Encounter   Procedures    Culture, Urine     Standing Status:   Future     Number of Occurrences:   1     Standing Expiration Date:   4/5/2022     Order Specific Question:   Specify (ex-cath, midstream, cysto, etc)? Answer:   mid stream    CT ABDOMEN PELVIS WO CONTRAST Additional Contrast? None     Standing Status:   Future     Number of Occurrences:   1     Standing Expiration Date:   4/5/2022     Order Specific Question:   Additional Contrast?     Answer:   None     Order Specific Question:   Reason for exam:     Answer:   history of kidney stone, dysuria     Orders Placed This Encounter   Medications    sulfamethoxazole-trimethoprim (BACTRIM DS;SEPTRA DS) 800-160 MG per tablet     Sig: Take 1 tablet by mouth 2 times daily for 7 days     Dispense:  14 tablet     Refill:  0    tamsulosin (FLOMAX) 0.4 MG capsule     Sig: Take 1 capsule by mouth daily     Dispense:  30 capsule     Refill:  0    verapamil (CALAN SR) 120 MG extended release tablet     Sig: Take 1 tablet by mouth daily     Dispense:  30 tablet     Refill:  0       Patient given educational materials - see patient instructions. Discussed use, benefit, and side effects of prescribed medications. All patientquestions answered. Pt voiced understanding. Reviewed health maintenance. Instructedto continue current medications, diet and exercise. Patient agreed with treatmentplan. Follow up as directed.      Electronically signed by Karl Beasley MD on 4/5/2021 at 7:18 PM

## 2021-04-05 NOTE — TELEPHONE ENCOUNTER
Received call from hSerri at [facility]-service center [facility]) [place] with [employer] Complaint. Brief description of triage: Urinary frequency and burning with lower back pain, symptoms started Saturday night. Triage indicates for patient to be seen in office now. Advised patient to go to [facility] if unable to get appointment. Care advice provided, patient verbalizes understanding; denies any other questions or concerns; instructed to call back for any new or worsening symptoms. Writer provided warm transfer to [place] at [facility] [place] for appointment scheduling. Attention Provider: Thank you for allowing me to participate in the care of your patient. The patient was connected to triage in response to information provided to the [facility]. Please do not respond through this encounter as the response is not directed to a shared pool. Reason for Disposition   Side (flank) or lower back pain present    Answer Assessment - Initial Assessment Questions  1. SEVERITY: \"How bad is the pain? \"  (e.g., Scale 1-10; mild, moderate, or severe)    - MILD (1-3): complains slightly about urination hurting    - MODERATE (4-7): interferes with normal activities      - SEVERE (8-10): excruciating, unwilling or unable to urinate because of the pain       Back pain: 6/10    2. FREQUENCY: \"How many times have you had painful urination today? \"       Not every time    3. PATTERN: \"Is pain present every time you urinate or just sometimes? \"       Just sometimes    4. ONSET: \"When did the painful urination start? \"       Felt off and Saturday and worse that night    5. FEVER: \"Do you have a fever? \" If so, ask: \"What is your temperature, how was it measured, and when did it start? \"      No    6. PAST UTI: \"Have you had a urine infection before? \" If so, ask: \"When was the last time? \" and \"What happened that time? \"       Yes    7. CAUSE: \"What do you think is causing the painful urination? \"  (e.g., UTI, scratch, Herpes sore)      UTI    8. OTHER SYMPTOMS: \"Do you have any other symptoms? \" (e.g., flank pain, vaginal discharge, genital sores, urgency, blood in urine)      Itching, back pain    9. PREGNANCY: \"Is there any chance you are pregnant? \" \"When was your last menstrual period? \"      No    Protocols used: URINATION PAIN - FEMALE-ADULT-[place]

## 2021-04-06 LAB
ABSOLUTE EOS #: 0.04 K/UL (ref 0–0.44)
ABSOLUTE IMMATURE GRANULOCYTE: 0.07 K/UL (ref 0–0.3)
ABSOLUTE LYMPH #: 1.21 K/UL (ref 1.1–3.7)
ABSOLUTE MONO #: 0.52 K/UL (ref 0.1–1.2)
ANION GAP SERPL CALCULATED.3IONS-SCNC: 11 MMOL/L (ref 9–17)
BASOPHILS # BLD: 0 % (ref 0–2)
BASOPHILS ABSOLUTE: 0.05 K/UL (ref 0–0.2)
BUN BLDV-MCNC: 11 MG/DL (ref 6–20)
BUN/CREAT BLD: ABNORMAL (ref 9–20)
CALCIUM SERPL-MCNC: 9.4 MG/DL (ref 8.6–10.4)
CHLORIDE BLD-SCNC: 102 MMOL/L (ref 98–107)
CO2: 27 MMOL/L (ref 20–31)
CREAT SERPL-MCNC: 0.71 MG/DL (ref 0.5–0.9)
CULTURE: NO GROWTH
CULTURE: NO GROWTH
DIFFERENTIAL TYPE: ABNORMAL
EOSINOPHILS RELATIVE PERCENT: 0 % (ref 1–4)
GFR AFRICAN AMERICAN: >60 ML/MIN
GFR NON-AFRICAN AMERICAN: >60 ML/MIN
GFR SERPL CREATININE-BSD FRML MDRD: ABNORMAL ML/MIN/{1.73_M2}
GFR SERPL CREATININE-BSD FRML MDRD: ABNORMAL ML/MIN/{1.73_M2}
GLUCOSE BLD-MCNC: 127 MG/DL (ref 70–99)
HCT VFR BLD CALC: 41.5 % (ref 36.3–47.1)
HEMOGLOBIN: 13.4 G/DL (ref 11.9–15.1)
IMMATURE GRANULOCYTES: 1 %
LYMPHOCYTES # BLD: 9 % (ref 24–43)
Lab: NORMAL
Lab: NORMAL
MCH RBC QN AUTO: 31.3 PG (ref 25.2–33.5)
MCHC RBC AUTO-ENTMCNC: 32.3 G/DL (ref 28.4–34.8)
MCV RBC AUTO: 97 FL (ref 82.6–102.9)
MONOCYTES # BLD: 4 % (ref 3–12)
NRBC AUTOMATED: 0 PER 100 WBC
PDW BLD-RTO: 12.1 % (ref 11.8–14.4)
PLATELET # BLD: 230 K/UL (ref 138–453)
PLATELET ESTIMATE: ABNORMAL
PMV BLD AUTO: 10.5 FL (ref 8.1–13.5)
POTASSIUM SERPL-SCNC: 4.1 MMOL/L (ref 3.7–5.3)
RBC # BLD: 4.28 M/UL (ref 3.95–5.11)
RBC # BLD: ABNORMAL 10*6/UL
SEG NEUTROPHILS: 86 % (ref 36–65)
SEGMENTED NEUTROPHILS ABSOLUTE COUNT: 11.22 K/UL (ref 1.5–8.1)
SODIUM BLD-SCNC: 140 MMOL/L (ref 135–144)
SPECIMEN DESCRIPTION: NORMAL
SPECIMEN DESCRIPTION: NORMAL
WBC # BLD: 13.1 K/UL (ref 3.5–11.3)
WBC # BLD: ABNORMAL 10*3/UL

## 2021-05-03 DIAGNOSIS — M54.50 ACUTE BILATERAL LOW BACK PAIN WITHOUT SCIATICA: ICD-10-CM

## 2021-05-03 DIAGNOSIS — R30.0 DYSURIA: ICD-10-CM

## 2021-05-03 DIAGNOSIS — G43.009 MIGRAINE WITHOUT AURA AND WITHOUT STATUS MIGRAINOSUS, NOT INTRACTABLE: ICD-10-CM

## 2021-05-03 RX ORDER — TAMSULOSIN HYDROCHLORIDE 0.4 MG/1
CAPSULE ORAL
Qty: 30 CAPSULE | Refills: 0 | Status: SHIPPED | OUTPATIENT
Start: 2021-05-03 | Stop reason: SDUPTHER

## 2021-05-03 NOTE — TELEPHONE ENCOUNTER
Next Visit Date:  Future Appointments   Date Time Provider Gavin Bijal   5/5/2021 10:45 AM MD [facility] [address] Maintenance   Topic Date Due    Hepatitis C screen  Never done    HIV screen  Never done    COVID-19 Vaccine (1) Never done    Cervical cancer screen  Never done    Flu vaccine (Season Ended) 09/01/2021    DTaP/Tdap/Td vaccine (2 - Td) 09/29/2024    Hepatitis A vaccine  Aged Out    Hepatitis B vaccine  Aged Out    Hib vaccine  Aged Out    Meningococcal (ACWY) vaccine  Aged Out    Pneumococcal 0-64 years Vaccine  Aged Out    Varicella vaccine  Discontinued       No results found for: LABA1C          ( goal A1C is < 7)   No results found for: LABMICR  No results found for: LDLCHOLESTEROL, LDLCALC    (goal LDL is <100)   BUN (mg/dL)   Date Value   04/05/2021 11     BP Readings from Last 3 Encounters:   04/05/21 120/80   07/08/20 109/69   01/08/20 121/77          (goal 120/80)    All Future Testing planned in [facility]  Lab Frequency Next Occurrence   Lipid, Fasting Once 07/08/2020   Comprehensive Metabolic Panel, Fasting Once 07/08/2020   TSH With Reflex Ft4 Once 07/08/2020   HIV Screen Once 07/08/2020               Patient Active Problem List:     Migraine     Seasonal allergic rhinitis due to pollen

## 2021-06-09 DIAGNOSIS — M54.50 ACUTE BILATERAL LOW BACK PAIN WITHOUT SCIATICA: ICD-10-CM

## 2021-06-09 DIAGNOSIS — G43.009 MIGRAINE WITHOUT AURA AND WITHOUT STATUS MIGRAINOSUS, NOT INTRACTABLE: ICD-10-CM

## 2021-06-09 DIAGNOSIS — R30.0 DYSURIA: ICD-10-CM

## 2021-06-09 RX ORDER — TAMSULOSIN HYDROCHLORIDE 0.4 MG/1
CAPSULE ORAL
Qty: 30 CAPSULE | Refills: 0 | Status: SHIPPED | OUTPATIENT
Start: 2021-06-09 | End: 2021-07-12

## 2021-06-09 NOTE — TELEPHONE ENCOUNTER
Next Visit Date:  No future appointments.     Health Maintenance   Topic Date Due    Hepatitis C screen  Never done    COVID-19 Vaccine (1) Never done    HIV screen  Never done    Cervical cancer screen  Never done    Flu vaccine (Season Ended) 09/01/2021    DTaP/Tdap/Td vaccine (2 - Td or Tdap) 09/29/2024    Hepatitis A vaccine  Aged Out    Hepatitis B vaccine  Aged Out    Hib vaccine  Aged Out    Meningococcal (ACWY) vaccine  Aged Out    Pneumococcal 0-64 years Vaccine  Aged Out    Varicella vaccine  Discontinued       No results found for: LABA1C          ( goal A1C is < 7)   No results found for: LABMICR  No results found for: LDLCHOLESTEROL, LDLCALC    (goal LDL is <100)   BUN (mg/dL)   Date Value   04/05/2021 11     BP Readings from Last 3 Encounters:   04/05/21 120/80   07/08/20 109/69   01/08/20 121/77          (goal 120/80)    All Future Testing planned in [facility]              Patient Active Problem List:     Migraine     Seasonal allergic rhinitis due to pollen

## 2021-07-12 DIAGNOSIS — M54.50 ACUTE BILATERAL LOW BACK PAIN WITHOUT SCIATICA: ICD-10-CM

## 2021-07-12 DIAGNOSIS — R30.0 DYSURIA: ICD-10-CM

## 2021-07-12 DIAGNOSIS — G43.009 MIGRAINE WITHOUT AURA AND WITHOUT STATUS MIGRAINOSUS, NOT INTRACTABLE: ICD-10-CM

## 2021-07-12 RX ORDER — TAMSULOSIN HYDROCHLORIDE 0.4 MG/1
CAPSULE ORAL
Qty: 30 CAPSULE | Refills: 0 | Status: SHIPPED | OUTPATIENT
Start: 2021-07-12 | End: 2021-08-13

## 2021-08-13 DIAGNOSIS — R30.0 DYSURIA: ICD-10-CM

## 2021-08-13 DIAGNOSIS — M54.50 ACUTE BILATERAL LOW BACK PAIN WITHOUT SCIATICA: ICD-10-CM

## 2021-08-13 DIAGNOSIS — G43.009 MIGRAINE WITHOUT AURA AND WITHOUT STATUS MIGRAINOSUS, NOT INTRACTABLE: ICD-10-CM

## 2021-08-13 RX ORDER — TAMSULOSIN HYDROCHLORIDE 0.4 MG/1
CAPSULE ORAL
Qty: 30 CAPSULE | Refills: 0 | Status: SHIPPED | OUTPATIENT
Start: 2021-08-13

## 2021-08-13 NOTE — TELEPHONE ENCOUNTER
Next Visit Date:  No future appointments.     Health Maintenance   Topic Date Due    Hepatitis C screen  Never done    COVID-19 Vaccine (1) Never done    HIV screen  Never done    Cervical cancer screen  Never done    Lipid screen  07/30/2021    Flu vaccine (1) 09/01/2021    DTaP/Tdap/Td vaccine (2 - Td or Tdap) 09/29/2024    Hepatitis A vaccine  Aged Out    Hepatitis B vaccine  Aged Out    Hib vaccine  Aged Out    Meningococcal (ACWY) vaccine  Aged Out    Pneumococcal 0-64 years Vaccine  Aged Out    Varicella vaccine  Discontinued       No results found for: LABA1C          ( goal A1C is < 7)   No results found for: LABMICR  No results found for: LDLCHOLESTEROL, LDLCALC    (goal LDL is <100)   BUN (mg/dL)   Date Value   04/05/2021 11     BP Readings from Last 3 Encounters:   04/05/21 120/80   07/08/20 109/69   01/08/20 121/77          (goal 120/80)    All Future Testing planned in [facility]              Patient Active Problem List:     Migraine     Seasonal allergic rhinitis due to pollen